# Patient Record
Sex: MALE | Race: WHITE
[De-identification: names, ages, dates, MRNs, and addresses within clinical notes are randomized per-mention and may not be internally consistent; named-entity substitution may affect disease eponyms.]

---

## 2017-06-09 ENCOUNTER — HOSPITAL ENCOUNTER (OUTPATIENT)
Dept: HOSPITAL 45 - C.LAB1850 | Age: 77
Discharge: HOME | End: 2017-06-09
Attending: INTERNAL MEDICINE
Payer: COMMERCIAL

## 2017-06-09 DIAGNOSIS — Z00.00: Primary | ICD-10-CM

## 2017-06-09 LAB
ALBUMIN/GLOB SERPL: 1 {RATIO} (ref 0.9–2)
ALP SERPL-CCNC: 68 U/L (ref 45–117)
ALT SERPL-CCNC: 56 U/L (ref 12–78)
ANION GAP SERPL CALC-SCNC: 10 MMOL/L (ref 3–11)
AST SERPL-CCNC: 49 U/L (ref 15–37)
BASOPHILS # BLD: 0.03 K/UL (ref 0–0.2)
BASOPHILS NFR BLD: 0.4 %
BUN SERPL-MCNC: 15 MG/DL (ref 7–18)
BUN/CREAT SERPL: 12.4 (ref 10–20)
CALCIUM SERPL-MCNC: 9 MG/DL (ref 8.5–10.1)
CHLORIDE SERPL-SCNC: 104 MMOL/L (ref 98–107)
CO2 SERPL-SCNC: 27 MMOL/L (ref 21–32)
COMPLETE: YES
CREAT SERPL-MCNC: 1.2 MG/DL (ref 0.6–1.4)
EOSINOPHIL NFR BLD AUTO: 138 K/UL (ref 130–400)
GLOBULIN SER-MCNC: 3.8 GM/DL (ref 2.5–4)
GLUCOSE SERPL-MCNC: 118 MG/DL (ref 70–99)
HCT VFR BLD CALC: 46.2 % (ref 42–52)
IG%: 0.1 %
IMM GRANULOCYTES NFR BLD AUTO: 24.1 %
LYMPHOCYTES # BLD: 1.62 K/UL (ref 1.2–3.4)
MCH RBC QN AUTO: 34.8 PG (ref 25–34)
MCHC RBC AUTO-ENTMCNC: 34.8 G/DL (ref 32–36)
MCV RBC AUTO: 99.8 FL (ref 80–100)
MONOCYTES NFR BLD: 11.3 %
NEUTROPHILS # BLD AUTO: 0.7 %
NEUTROPHILS NFR BLD AUTO: 63.4 %
PMV BLD AUTO: 9.8 FL (ref 7.4–10.4)
POTASSIUM SERPL-SCNC: 4.6 MMOL/L (ref 3.5–5.1)
RBC # BLD AUTO: 4.63 M/UL (ref 4.7–6.1)
SODIUM SERPL-SCNC: 141 MMOL/L (ref 136–145)
WBC # BLD AUTO: 6.72 K/UL (ref 4.8–10.8)

## 2017-06-15 NOTE — CODING QUERY MEDICAL NECESSITY
CQSUPPORTING DIAGNOSIS NEEDED





A supporting diagnosis is required for the test/procedure performed on this patient in 
order for us to be reimbursed by the patient's insurance. Please provide a supporting 
diagnosis for the following test/procedure listed below next to the test name along with 
your signature. 



*If there is no additional diagnosis for this patient that would support the following 
test/procedure please document that below next to the test/procedure.



Test(s)/Procedure(s) that require a supporting diagnosis:





DOS     06/09/17     BLOOD  COUNT







Provider Signature:  ______________________________  Date:  _______



Thank you  

Gissel Aggarwal

[employer] [facility]

Phone:  [phone]

Fax:  [phone]



Once completed, please kindly fax back to [phone]



For questions please call [phone]

## 2018-03-19 ENCOUNTER — HOSPITAL ENCOUNTER (OUTPATIENT)
Dept: HOSPITAL 45 - C.LABSPEC | Age: 78
Discharge: HOME | End: 2018-03-19
Attending: INTERNAL MEDICINE
Payer: COMMERCIAL

## 2018-03-19 DIAGNOSIS — I42.9: ICD-10-CM

## 2018-03-19 DIAGNOSIS — E78.5: Primary | ICD-10-CM

## 2018-03-19 LAB
BUN SERPL-MCNC: 20 MG/DL (ref 7–18)
CALCIUM SERPL-MCNC: 9.2 MG/DL (ref 8.5–10.1)
CO2 SERPL-SCNC: 27 MMOL/L (ref 21–32)
CREAT SERPL-MCNC: 1.25 MG/DL (ref 0.6–1.4)
GLUCOSE SERPL-MCNC: 109 MG/DL (ref 70–99)
LDL CHOLESTEROL (DIRECT): 167 MG/DL
PH UR: 239 MG/DL (ref 0–200)
POTASSIUM SERPL-SCNC: 4.6 MMOL/L (ref 3.5–5.1)
SODIUM SERPL-SCNC: 135 MMOL/L (ref 136–145)

## 2018-04-10 ENCOUNTER — HOSPITAL ENCOUNTER (OUTPATIENT)
Dept: HOSPITAL 45 - C.ULTR | Age: 78
Discharge: HOME | End: 2018-04-10
Attending: INTERNAL MEDICINE
Payer: COMMERCIAL

## 2018-04-10 DIAGNOSIS — I73.9: Primary | ICD-10-CM

## 2018-04-10 NOTE — DIAGNOSTIC IMAGING REPORT
BILATERAL LOWER EXTREMITY ARTERIAL DOPPLER ULTRASOUND



CLINICAL HISTORY: Bilateral lower external the peripheral artery disease.    



COMPARISON STUDY:  No previous studies for comparison.



FINDINGS: The right ankle to brachial measured 0.87 when using posterior tibial

artery and 0.92 when using the dorsalis pedis. The left ankle to brachial index

measured 0.86 when using the posterior tibial artery and 0.74 when using the

dorsalis pedis. No vessel occlusion is identified by sonography. Moderate plaque

is noted without convincing evidence for a hemodynamically significant stenosis

within the lower extremities. There is biphasic and triphasic flow within the

right common femoral and superficial femoral arteries with monophasic flow

within the right popliteal, posterior tibial, anterior tibial and dorsalis pedis

vessels. There is triphasic flow within left common femoral, superficial and

popliteal arteries as well as the left anterior tibial artery with monophasic

flow within the remainder of the arteries of the left lower extremity.



IMPRESSION:  



1. Mildly diminished bilateral ankle to brachial indices, as described above.



2. Moderate atherosclerotic plaque within each lower extremity without

convincing evidence for a hemodynamically significant stenosis. 



3. No vessel occlusion identified. Monophasic flow within multiple bilateral

calf vessels, as described above.









Electronically signed by:  Lorenzo Malagon M.D.

4/10/2018 2:24 PM



Dictated Date/Time:  4/10/2018 2:20 PM

## 2018-08-08 ENCOUNTER — HOSPITAL ENCOUNTER (OUTPATIENT)
Dept: HOSPITAL 45 - C.LABSPEC | Age: 78
Discharge: HOME | End: 2018-08-08
Attending: INTERNAL MEDICINE
Payer: COMMERCIAL

## 2018-08-08 DIAGNOSIS — G72.9: ICD-10-CM

## 2018-08-08 DIAGNOSIS — I48.91: ICD-10-CM

## 2018-08-08 DIAGNOSIS — R73.9: Primary | ICD-10-CM

## 2018-08-08 DIAGNOSIS — R25.1: ICD-10-CM

## 2018-08-08 LAB
ALBUMIN SERPL-MCNC: 3.7 GM/DL (ref 3.4–5)
ALP SERPL-CCNC: 68 U/L (ref 45–117)
ALT SERPL-CCNC: 44 U/L (ref 12–78)
AST SERPL-CCNC: 51 U/L (ref 15–37)
BASOPHILS # BLD: 0.09 K/UL (ref 0–0.2)
BASOPHILS NFR BLD: 1.5 %
BUN SERPL-MCNC: 16 MG/DL (ref 7–18)
CALCIUM SERPL-MCNC: 9.2 MG/DL (ref 8.5–10.1)
CO2 SERPL-SCNC: 28 MMOL/L (ref 21–32)
CREAT SERPL-MCNC: 1.13 MG/DL (ref 0.6–1.4)
EOS ABS #: 0.09 K/UL (ref 0–0.5)
EOSINOPHIL NFR BLD AUTO: 200 K/UL (ref 130–400)
GLUCOSE SERPL-MCNC: 94 MG/DL (ref 70–99)
HBA1C MFR BLD: 5.2 % (ref 4.5–5.6)
HCT VFR BLD CALC: 41.9 % (ref 42–52)
HGB BLD-MCNC: 14.9 G/DL (ref 14–18)
IG#: 0.01 K/UL (ref 0–0.02)
IMM GRANULOCYTES NFR BLD AUTO: 26.8 %
LYMPHOCYTES # BLD: 1.56 K/UL (ref 1.2–3.4)
MCH RBC QN AUTO: 38.1 PG (ref 25–34)
MCHC RBC AUTO-ENTMCNC: 35.6 G/DL (ref 32–36)
MCV RBC AUTO: 107.2 FL (ref 80–100)
MONO ABS #: 0.56 K/UL (ref 0.11–0.59)
MONOCYTES NFR BLD: 9.6 %
NEUT ABS #: 3.51 K/UL (ref 1.4–6.5)
NEUTROPHILS # BLD AUTO: 1.5 %
NEUTROPHILS NFR BLD AUTO: 60.4 %
PMV BLD AUTO: 10 FL (ref 7.4–10.4)
POTASSIUM SERPL-SCNC: 4.5 MMOL/L (ref 3.5–5.1)
PROT SERPL-MCNC: 7.4 GM/DL (ref 6.4–8.2)
RED CELL DISTRIBUTION WIDTH CV: 13.8 % (ref 11.5–14.5)
RED CELL DISTRIBUTION WIDTH SD: 53.3 FL (ref 36.4–46.3)
SODIUM SERPL-SCNC: 137 MMOL/L (ref 136–145)
WBC # BLD AUTO: 5.82 K/UL (ref 4.8–10.8)

## 2018-08-10 ENCOUNTER — HOSPITAL ENCOUNTER (OUTPATIENT)
Dept: HOSPITAL 45 - C.MRI | Age: 78
Discharge: HOME | End: 2018-08-10
Attending: INTERNAL MEDICINE
Payer: COMMERCIAL

## 2018-08-10 DIAGNOSIS — R42: Primary | ICD-10-CM

## 2018-08-10 NOTE — DIAGNOSTIC IMAGING REPORT
BRAIN COMBO



CLINICAL HISTORY: DISEQUALIBRIUM    



COMPARISON STUDY:  No previous studies for comparison. 



TECHNIQUE:  Utilizing a 1.5 Jonna magnet and dedicated coil, multiplanar,

multiecho imaging of the brain was performed pre and postcontrast

administration.  IV administration of 1.5 mL of Gadavist contrast was

uneventful.



FINDINGS: Diffusion-weighted images are negative for an acute ischemic event.

Age-related atrophy and chronic small vessel change. Mild compensatory

prominence of the ventricular system. No abnormal postcontrast enhancement.



IMPRESSION:  Age-related change. No acute process.











The above report was generated using voice recognition software.  It may contain

grammatical, syntax or spelling errors.







Electronically signed by:  Po Schwab M.D.

8/10/2018 10:06 AM



Dictated Date/Time:  8/10/2018 10:05 AM